# Patient Record
Sex: FEMALE | Race: WHITE | Employment: FULL TIME | URBAN - METROPOLITAN AREA
[De-identification: names, ages, dates, MRNs, and addresses within clinical notes are randomized per-mention and may not be internally consistent; named-entity substitution may affect disease eponyms.]

---

## 2020-02-18 RX ORDER — DEXTROMETHORPHAN/PSEUDOEPHED 2.5-7.5/.8
1.2 DROPS ORAL
Status: CANCELLED | OUTPATIENT
Start: 2020-02-18

## 2020-02-18 RX ORDER — SODIUM CHLORIDE 0.9 % (FLUSH) 0.9 %
5-40 SYRINGE (ML) INJECTION AS NEEDED
Status: CANCELLED | OUTPATIENT
Start: 2020-02-18

## 2020-02-18 RX ORDER — DIPHENHYDRAMINE HYDROCHLORIDE 50 MG/ML
50 INJECTION, SOLUTION INTRAMUSCULAR; INTRAVENOUS ONCE
Status: CANCELLED | OUTPATIENT
Start: 2020-02-18 | End: 2020-02-18

## 2020-02-18 RX ORDER — EPINEPHRINE 0.1 MG/ML
1 INJECTION INTRACARDIAC; INTRAVENOUS
Status: CANCELLED | OUTPATIENT
Start: 2020-02-18 | End: 2020-02-19

## 2020-02-18 RX ORDER — SODIUM CHLORIDE 0.9 % (FLUSH) 0.9 %
5-40 SYRINGE (ML) INJECTION EVERY 8 HOURS
Status: CANCELLED | OUTPATIENT
Start: 2020-02-18

## 2020-02-18 RX ORDER — ATROPINE SULFATE 0.1 MG/ML
0.5 INJECTION INTRAVENOUS
Status: CANCELLED | OUTPATIENT
Start: 2020-02-18 | End: 2020-02-19

## 2020-02-19 ENCOUNTER — HOSPITAL ENCOUNTER (OUTPATIENT)
Age: 50
Setting detail: OUTPATIENT SURGERY
Discharge: HOME | End: 2020-02-19
Attending: INTERNAL MEDICINE | Admitting: INTERNAL MEDICINE
Payer: COMMERCIAL

## 2020-02-19 VITALS
SYSTOLIC BLOOD PRESSURE: 128 MMHG | DIASTOLIC BLOOD PRESSURE: 67 MMHG | TEMPERATURE: 97.7 F | RESPIRATION RATE: 16 BRPM | HEIGHT: 64 IN | HEART RATE: 91 BPM | WEIGHT: 228 LBS | BODY MASS INDEX: 38.93 KG/M2 | OXYGEN SATURATION: 96 %

## 2020-02-19 LAB — GLUCOSE BLD STRIP.AUTO-MCNC: 178 MG/DL (ref 70–110)

## 2020-02-19 PROCEDURE — 82962 GLUCOSE BLOOD TEST: CPT

## 2020-02-19 PROCEDURE — 77030040361 HC SLV COMPR DVT MDII -B: Performed by: INTERNAL MEDICINE

## 2020-02-19 PROCEDURE — 99153 MOD SED SAME PHYS/QHP EA: CPT | Performed by: INTERNAL MEDICINE

## 2020-02-19 PROCEDURE — 76040000008: Performed by: INTERNAL MEDICINE

## 2020-02-19 PROCEDURE — G0500 MOD SEDAT ENDO SERVICE >5YRS: HCPCS | Performed by: INTERNAL MEDICINE

## 2020-02-19 PROCEDURE — 74011250636 HC RX REV CODE- 250/636: Performed by: INTERNAL MEDICINE

## 2020-02-19 RX ORDER — NALOXONE HYDROCHLORIDE 0.4 MG/ML
0.4 INJECTION, SOLUTION INTRAMUSCULAR; INTRAVENOUS; SUBCUTANEOUS
Status: DISCONTINUED | OUTPATIENT
Start: 2020-02-19 | End: 2020-02-19 | Stop reason: HOSPADM

## 2020-02-19 RX ORDER — MIDAZOLAM HYDROCHLORIDE 1 MG/ML
.25-5 INJECTION, SOLUTION INTRAMUSCULAR; INTRAVENOUS
Status: DISCONTINUED | OUTPATIENT
Start: 2020-02-19 | End: 2020-02-19 | Stop reason: HOSPADM

## 2020-02-19 RX ORDER — FLUMAZENIL 0.1 MG/ML
0.2 INJECTION INTRAVENOUS
Status: DISCONTINUED | OUTPATIENT
Start: 2020-02-19 | End: 2020-02-19 | Stop reason: HOSPADM

## 2020-02-19 RX ORDER — FENTANYL CITRATE 50 UG/ML
100 INJECTION, SOLUTION INTRAMUSCULAR; INTRAVENOUS
Status: DISCONTINUED | OUTPATIENT
Start: 2020-02-19 | End: 2020-02-19 | Stop reason: HOSPADM

## 2020-02-19 RX ORDER — LOSARTAN POTASSIUM 25 MG/1
25 TABLET ORAL DAILY
COMMUNITY

## 2020-02-19 RX ORDER — SODIUM CHLORIDE 9 MG/ML
1000 INJECTION, SOLUTION INTRAVENOUS CONTINUOUS
Status: DISCONTINUED | OUTPATIENT
Start: 2020-02-19 | End: 2020-02-19 | Stop reason: HOSPADM

## 2020-02-19 RX ADMIN — SODIUM CHLORIDE 1000 ML: 900 INJECTION, SOLUTION INTRAVENOUS at 08:19

## 2020-02-19 NOTE — PROCEDURES
[facility]  Colonoscopy Procedure Report  _______________________________________________________  Patient: Vielka Galloway                                         Attending Physician: Perfecto Santana MD    Patient ID: [id number]                                      Referring Physician: Liv Villa MD    Exam Date: February 19, 2020 _______________________________________________________      Introduction: A  48 y.o. female patient, presents for outpatient Colonoscopy    Indications: Screen colon cancer, average risk and asymptomatic. Consent: The benefits, risks, and alternatives to the procedure were discussed and informed consent was obtained from the patient. Preparation: EKG, pulse, pulse oximetry and blood pressure were monitored throughout the procedure. ASA Classification: Class 2 - . The heart is an S1-S2 and regular heart rate and rhythm. Lungs are clear to auscultation and percussion. Abdomen is soft, nondistended, and nontender. Mental Status: awake, alert, and oriented to person, place, and time    Medications:  · Previously sedated for the EGD  · Versed 4 mg IV throughout the procedure. Rectal Exam: Normal Rectal Exam slightly spastic anal sphincter. No Blood. Pathology Specimens: No specimens removed. Procedure: The colonoscope was passed with ease through the anus under direct visualization and advanced to the cecum. The scope was withdrawn and the mucosa was carefully examined. The quality of the preparation was excellent. The views were excellent. The patient's toleration of the procedure was excellent. The exam was done twice to the cecum. Total time is 15 minutes and withdrawal time is 10 minutes. Findings:    Rectum:   Medium sized internal hemorrhoids. Sigmoid:   Tortuous sigmoid colon. Descending Colon:   Normal  Transverse Colon:   Normal  Ascending Colon:   Normal  Cecum:   Normal  Terminal Ileum:   Not entered      Unplanned Events:  There were no unplanned events. Estimated Blood Loss: None  Impressions:    Medium sized internal hemorrhoids. Tortuous sigmoid colon. Normal Mucosa. No diverticula or polyps found. Complications: None; patient tolerated the procedure well. Recommendations:  · Discharge home when standard parameters are met. · Resume a high fiber diet. · Colonoscopy recommendation in 10 years.   · Consider getting hemorrhoidal band ligation done at the office if hemorrhoids are still annoying you despite high fiber diet  Procedure Codes:    · COLONOSCOPY [[id number] (Type: Custom)]    Endoscope Information:  Model Number(s)    [id number]   Assistant: None  Signed By: Estrellita Hernandez MD Date: February 19, 2020

## 2020-02-19 NOTE — DISCHARGE INSTRUCTIONS
[facility]  [id number]  1970    COLON / EGD DISCHARGE INSTRUCTIONS    Discomfort:  Sore throat- throat lozenges or warm salt water gargle  Redness at IV site- apply warm compress to area; if redness or soreness persist- contact your physician  There may be a slight amount of blood passed from the rectum  Gaseous discomfort- walking, belching will help relieve any discomfort  You may not operate a vehicle until next day  You may not engage in an occupation involving machinery or appliances until next day  You may not drink alcoholic beverages until next day  Avoid making any critical decisions for at least 24 hour    DIET:   High fiber and anti reflux diet. ACTIVITY:  You may not  resume your normal daily activities until the next day, it is recommended that you spend the remainder of the day resting -  avoid any strenuous activity. CALL M.D.  IF ANY SIGN OF:   Increasing pain, nausea, vomiting  Abdominal distension (swelling)  New increased bleeding (oral or rectal)  Fever (chills)  Pain in chest area  Bloody discharge from nose or mouth  Shortness of breath     You may take any Advil, Aspirin, Ibuprofen, Motrin, Aleve, or Goodys but preferably Tylenol as needed for pain. Follow-up Instructions: Follow-up in the office as scheduled or make a follow-up appointment in 2 weeks. Jacob Ying MD  February 19, 2020      DISCHARGE SUMMARY from Nurse    PATIENT INSTRUCTIONS:    After general anesthesia or intravenous sedation, for 24 hours or while taking prescription Narcotics:  · Limit your activities  · Do not drive and operate hazardous machinery  · Do not make important personal or business decisions  · Do  not drink alcoholic beverages  · If you have not urinated within 8 hours after discharge, please contact your surgeon on call.     Report the following to your surgeon:  · Excessive pain, swelling, redness or odor of or around the surgical area  · Temperature over 100.5  · Nausea and vomiting lasting longer than 4 hours or if unable to take medications  · Any signs of decreased circulation or nerve impairment to extremity: change in color, persistent  numbness, tingling, coldness or increase pain  · Any questions  . *  Please give a list of your current medications to your Primary Care Provider. *  Please update this list whenever your medications are discontinued, doses are      changed, or new medications (including over-the-counter products) are added. *  Please carry medication information at all times in case of emergency situations. These are general instructions for a healthy lifestyle:    No smoking/ No tobacco products/ Avoid exposure to second hand smoke  Surgeon General's Warning:  Quitting smoking now greatly reduces serious risk to your health. Obesity, smoking, and sedentary lifestyle greatly increases your risk for illness    A healthy diet, regular physical exercise & weight monitoring are important for maintaining a healthy lifestyle    You may be retaining fluid if you have a history of heart failure or if you experience any of the following symptoms:  Weight gain of 3 pounds or more overnight or 5 pounds in a week, increased swelling in our hands or feet or shortness of breath while lying flat in bed. Please call your doctor as soon as you notice any of these symptoms; do not wait until your next office visit. The discharge information has been reviewed with the patient and spouse. The patient and spouse verbalized understanding. Discharge medications reviewed with the patient and spouse and appropriate educational materials and side effects teaching were provided.  Patient armband removed and shredded  ___________________________________________________________________________________________________________________________________

## 2020-02-19 NOTE — H&P
Assessment/Plan  # Detail Type Description    1. Assessment Right upper quadrant abdominal pain (R10.11). Patient Plan 52year old female referred by Dr. Vikas Zepeda here right upper quadrant pain. Onset approx 1 month that is constant daily. Pain is rated 6/10 at its worst. Pain is described as  a stabbing pain. No aggravating or relieving factors. Eating does not affect the pain and pain does not affect sleep. Imaging includes an AB U/S 2019 and CT scan in 2019 that showed hepatosteatosis otherwise unremarkable. Abdominal Surgeries include cholecystectomy approx 20 years ago, Csection X1 and hysterectomy. No abdominal trauma. BM: daily, no blood/mucus  No chest pain or heartburn issues. Denies tobacco use but reports rare ETOH.  N/V/F/C  Appetite is good. Pt reports chronic use of NSAIDS Motrin several times a day with no relief. No Fam Hx Crohn's, IBS, U/C, Celiac, Diverticulosis, Colon Ca. Medical hx remarkable for DM Type I, and hyperlipidemia. PLAN: EGD scheduled, Hep Panel, Amylase, Lipase, CBC,    Plan Orders Amylase, Serum to be performed, CBC With Differential/Platelet to be performed, Hepatic Function Panel (7) to be performed and Lipase, Serum to be performed. 2. Assessment Encounter for screening colonoscopy (Z12.11). Patient Plan  52year old  female patient of Dr. Vikas Zepeda  for colonoscopy. Last colonoscopy completed 10 years ago. BM once daily. Normal color, soft, formed in consistency. No evidence of blood or mucus, changes in bowel pattern or constipation issues. Patient reports no allergies or herbal consumption. Medical hx includes ectopic kidney, heart murmur, DM Type 1. No significant pulmonary, GI, , renal, hepatic, musculoskeletal,  issues. Surgical hx remarkable for cholecystectomy, . No family history of colorectal CA. Denies tobacco and reports rare ETOH use. No significant weight gains or losses in the last 3-6 months.  No heat or cold intolerances. Patient states  no N/V/D, fever, chills, sick contacts, SOB, abdominal or chest pains. No dysphagia, appetite is good which consists of 3 meals per day. PLAN: Colonoscopy Scheduled     She has average risk for colon cancer and is asymptomatic. She would be having her screening colonoscopy. I explained to her the procedure of colonoscopy and the risks involved which include but not limited to reaction to sedation, bleeding, perforation, infection or missing a lesion if bowels are not well prepped or are unusually tortuous. She agreed to proceed with the procedure and answered her questions. I gave her the approved prep  to clean her bowels. I advised her to take if needed extra laxatives for few days before in case she is on the constipated side to assure adequate bowel prep. Told her not take her medications in the morning of the procedure because they would be flushed out with the prep but can take them more confidently after the procedure. I advised her to bring all her medication with her. This 52year old female presents for Abdominal Pain. History of Present Illness:  1. Abdominal Pain   Onset: 1 month ago. Severity is moderate. It occurs daily. The problem is unchanged. Location is RUQ. There is radiation to back. The patient describes it as aching and sharp. Denies aggravating factors. Relieving factors include Motrin. Associated symptoms include back pain, bloating, constipation, diarrhea, fatigue and flank pain. Pertinent negatives include blood in stool, change in appetite, diaphoresis, dizziness, dyspnea, eructation, fever, flatulence, flushing, heartburn, hematuria, jaundice, lightheadedness, menstruation, myalgia, nausea, rash, reflux, vaginal bleeding, vaginal discharge, vomiting, weight gain and weight loss. Additional information: Has not recently traveled out of country. Does not have reptilian pets. Does not have well water. PAST MEDICAL/SURGICAL HISTORY   (Detailed)    Disease/disorder Onset Date Management Date Comments     Hysterectomy, total       Cholecystectomy (20 years ago)     Diabetes          section           Family History  (Detailed)  Relationship Family Member Name  Age at Death Condition Onset Age Cause of Death       Family history of Hypertension  N       Family history of Kidney disease  N       Family history of Tuberculosis  N       Family history of Diabetes mellitus  N       Family history of Cancer, unknown  N       Family history of Heart disease  N       Family history of Mental illness  N       Family history of Asthma  N   Father    Myocardial infarction  N   Mother  N  Alive and well     Paternal grandmother    Tuberculosis  N   Sister  N  Alive and well           Social History:  (Detailed)  Tobacco use reviewed. The patient is right-handed. Preferred language is Declined to specify. Tobacco use status: Current non-smoker. Smoking status: Never smoker. SMOKING STATUS  Use Status Type Smoking Status Usage Per Day Years Used Total Pack Years   no/never  Never smoker        ALCOHOL  There is no history of alcohol use. CAFFEINE  The patient uses caffeine: coffee and soda. - 2 cups a day.             Medications (active prior to today)  Medication Instructions Start Date Stop Date Refilled Elsewhere   Humulin 70/30 U-100 Insulin 100 unit/mL subcutaneous suspension inject by subcutaneous route as per insulin protocol //   Y   cyanocobalamin (vitamin B-12) 1,000 mcg capsule  //   Y   Effexor XR 75 mg capsule,extended release take 1 capsule by oral route  every day with food //   Y   fenofibrate 160 mg tablet take 1 tablet by oral route  every day //   Y   ergocalciferol (vitamin D2) 50,000 unit capsule take 1 capsule by oral route  every week //   Y   metformin 1,000 mg tablet take 1 tablet by oral route 2 times every day with morning and evening meals //   Y     Patient Status   Completed with information received for patient in a summary of care record. Medications (Added, Continued or Stopped today)  Start Date Medication Directions PRN Status PRN Reason Instruction Stop Date    cyanocobalamin (vitamin B-12) 1,000 mcg capsule  N       Effexor XR 75 mg capsule,extended release take 1 capsule by oral route  every day with food N       ergocalciferol (vitamin D2) 50,000 unit capsule take 1 capsule by oral route  every week N       fenofibrate 160 mg tablet take 1 tablet by oral route  every day N       Humulin 70/30 U-100 Insulin 100 unit/mL subcutaneous suspension inject by subcutaneous route as per insulin protocol N       metformin 1,000 mg tablet take 1 tablet by oral route 2 times every day with morning and evening meals N        Allergies:  Ingredient Reaction (Severity) Medication Name Comment   SULFAMETHOXAZOLE Unknown SEPTRA    TRIMETHOPRIM Unknown SEPTRA    Reviewed, no changes. ORDERS:  Status Lab Order Time Frame Comments   ordered Lipase, Serum     ordered CBC With Differential/Platelet     ordered Amylase, Serum     ordered Hepatic Function Panel (7)     Review of Systems  System Neg/Pos Details   Constitutional Positive Fatigue. Constitutional Negative Chills, Fever, Flushing, Malaise, Weight gain and Weight loss. ENMT Negative Ear infections, Nasal congestion, Sinus Infection and Sore throat. Eyes Negative Double vision and Eye pain. Respiratory Negative Asthma, Chronic cough, Dyspnea, Pleuritic pain and Wheezing. Cardio Negative Chest pain, Edema and Irregular heartbeat/palpitations. GI Positive Bloating, Constipation, Diarrhea. GI Negative Abdominal pain, Blood in stool, Change in appetite, Change in bowel habits, Decreased appetite, Dysphagia, Eructation, Flatulence, Heartburn, Hematemesis, Hematochezia, Jaundice, Melena, Nausea, Reflux and Vomiting.  Positive Back pain, Flank pain.     Negative Dysuria, Hematuria, Menstruation, Urinary frequency, Urinary incontinence, Urinary retention and Vaginal bleeding. Endocrine Negative Cold intolerance, Diaphoresis, Heat intolerance and Increased thirst.   Neuro Negative Dizziness, Headache, Lightheadedness, Numbness, Tremors and Vertigo. Psych Negative Anxiety, Depression and Increased stress. Integumentary Negative Hives, Pruritus and Rash. MS Negative Back pain, Joint pain and Myalgia. Hema/Lymph Negative Easy bleeding, Easy bruising and Lymphadenopathy. Allergic/Immuno Negative Chemicals in work place, Contact allergy, Food allergies, Immunosuppression and Seasonal allergies. Reproductive Negative Breast lumps, Breast pain and Vaginal discharge. Vital Signs     Height  Time ft in cm Last Measured Height Position   3:43 PM 5.0 6.00 167.64 11/11/2019 Standing     MAP (Calculated) Arterial Line 1 BP (mmHg) BP Patient Position Resp SpO2 O2 Device O2 Flow Rate (L/min) Pre/Post Ductal Weight       02/19/20 0905 -- 99 147/83 104 -- -- 16 96 % Room air -- -- --   02/19/20 0900 -- 86 144/78 100 -- -- 17 97 % Room air -- -- --   02/19/20 0751 97.8 °F (36.6 °C) 83 127/83 98 -- -- 16 95 % Room air -- -- 103.4 kg (228 lb)           PHYSICAL EXAM:  Exam Findings Details   Constitutional * Nourishment - obese. Constitutional Normal No acute distress. Well developed. Eyes Normal Conjunctiva - Right: Normal, Left: Normal. Sclera - Right: Normal, Left: Normal.   Nasopharynx Normal Lips/teeth/gums - Normal. Buccal mucosa - Normal.   Neck Exam Normal Inspection - Normal. Palpation - Normal. Thyroid gland - Normal.   Respiratory Normal Inspection - Normal. Auscultation - Normal.   Cardiovascular Normal Regular rate and rhythm. No murmurs, gallops, or rubs. Vascular Normal Pulses - Radial: Normal, Brachial: Normal, Dorsalis pedis: Normal, Posterior tibial: Normal.   Abdomen * Inspection - rounded.    Abdomen Normal Appliance(s) - Normal. Abdominal muscles - Normal. Auscultation - Normal. Percussion - Normal. Anterior palpation - Normal, No guarding. Umbilicus - Normal. No abdominal tenderness. No hepatic enlargement. No spleen enlargement. No hernia. No Ascites. Boyce's sign - Negative. No hepatic tenderness. No hepatic bruit. Skin Normal Inspection - Normal.   Musculoskeletal Normal Hands/Wrist - Right: Normal, Left: Normal.   Extremity Normal No edema. Neurological Normal Fine motor skills - Normal.   Psychiatric Normal Orientation - Oriented to time, place, person & situation. Appropriate mood and affect.        No change in H&P

## 2020-02-19 NOTE — PROCEDURES
[facility]  Esophagogastroduodenoscopy Procedure Report  _______________________________________________________  Patient: Derek Gtz  Attending Physician: Audrey Jernigan MD    Patient ID: [id number]                                     Referring Physician: Dr. Mehran Handy    Exam Date: 2/19/2020   _______________________________________________________      Introduction: A 48 y.o. Female, presents for an Esophagogastroduodenoscopy Procedure    Indication: referred by Dr. Mehran Handy here right upper quadrant pain. Onset approx 1 month that is constant daily. Pain is rated 6/10 at its worst. Pain is described as  a stabbing pain. No aggravating or relieving factors. Eating does not affect the pain and pain does not affect sleep. Imaging includes an AB U/S 8/2019 and CT scan in 06/2019 that showed hepatosteatosis otherwise unremarkable. Abdominal Surgeries: cholecystectomy > 20 years ago, Csection X1 and hysterectomy. No abdominal trauma. BM: daily, no blood/mucus. No chest pain or heartburn issues. Denies tobacco use but reports rare. Pt reports chronic use of NSAIDS Motrin several times a day with no relief. No Fam Hx Crohn's, IBS, U/C, Celiac, Diverticulosis, Colon Ca. Medical hx remarkable for DM Type I, and hyperlipidemia. : Audrey Jernigan MD    Sedation:    Versed 5 mg IV, fentanyl 100 mcg IV    Procedure Details:  After infomed consent was obtained for the procedure, with all risks and benefits of procedure explained the patient was taken to the endoscopy suite and placed in the left lateral decubitus position. Following sequential administration of sedation as per above, the endoscope was inserted into the mouth and advanced under direct vision to third portion of the duodenum. A careful inspection was made as the gastroscope was withdrawn, including a retroflexed view of the proximal stomach; findings and interventions are described below.   ASA status: II      FINDINGS:  HYPOPHARYNX AND LARYNX: Normal  Esophagus: Normal proximal, middle and distal esophagus. Slightly irregular Z line. No Hiatal hernia. Diaphragmatic pinch located at 40 cm. No varices. Stomach: No food or liquid retention. Normal, cardia, fundus, body, lesser curvature, incisura, antrum and pylorus. Mucosa is normal.    Duodenum:   The bulb, second, third, fourth portions and major papilla are normal     Therapies:    none    Specimen:   none           Complications:   None    EBL:  None  IMPRESSION:  The Z line is Slightly irregular. The pain in the RUQ is simply caused by musculoskeletal or parietal cause       RECOMMENDATIONS: -GERD diet: avoid fried and fatty foods. peppermint, chocolate, alcohol, coffee, citrus fruits and juices, tomoato products; avoid lying down for 2 to 3 hours after eating.     Assistant: none    Debbie Hager MD  2/19/2020  9:10 AM

## 2021-10-05 ENCOUNTER — OFFICE VISIT (OUTPATIENT)
Dept: SURGERY | Age: 51
End: 2021-10-05
Payer: COMMERCIAL

## 2021-10-05 VITALS
BODY MASS INDEX: 37.85 KG/M2 | OXYGEN SATURATION: 100 % | HEART RATE: 97 BPM | HEIGHT: 65 IN | TEMPERATURE: 97.5 F | DIASTOLIC BLOOD PRESSURE: 70 MMHG | WEIGHT: 227.2 LBS | SYSTOLIC BLOOD PRESSURE: 134 MMHG

## 2021-10-05 DIAGNOSIS — I10 HYPERTENSION, UNSPECIFIED TYPE: ICD-10-CM

## 2021-10-05 DIAGNOSIS — K21.9 GASTROESOPHAGEAL REFLUX DISEASE, UNSPECIFIED WHETHER ESOPHAGITIS PRESENT: ICD-10-CM

## 2021-10-05 DIAGNOSIS — E10.9 TYPE 1 DIABETES MELLITUS WITHOUT COMPLICATION (HCC): ICD-10-CM

## 2021-10-05 DIAGNOSIS — G47.30 SLEEP APNEA, UNSPECIFIED TYPE: ICD-10-CM

## 2021-10-05 DIAGNOSIS — J45.909 ASTHMA, UNSPECIFIED ASTHMA SEVERITY, UNSPECIFIED WHETHER COMPLICATED, UNSPECIFIED WHETHER PERSISTENT: ICD-10-CM

## 2021-10-05 DIAGNOSIS — E66.01 SEVERE OBESITY (BMI 35.0-35.9 WITH COMORBIDITY) (HCC): Primary | ICD-10-CM

## 2021-10-05 PROCEDURE — 99205 OFFICE O/P NEW HI 60 MIN: CPT | Performed by: NURSE PRACTITIONER

## 2021-10-05 RX ORDER — PHENTERMINE HYDROCHLORIDE 37.5 MG/1
37.5 CAPSULE ORAL
COMMUNITY
End: 2022-01-12 | Stop reason: ALTCHOICE

## 2021-10-05 NOTE — PROGRESS NOTES
Bariatric Surgery Consultation    Subjective: The patient is a 46 y.o. obese female with a Body mass index is 38.4 kg/m². .  The patient is at her heaviest weight for the past several years. she has been overweight since starting insulin and having hysterectomy. she has been considering surgery since past year. she desires surgery at this time because of multiple health concerns and their lifestyle issues which are hindered by their weight. she has been referred by her family physician Dr Kami Ackerman for evaluation and treatment of their obesity via surgical intervention. Tram Carbajal has tried multiple diets in her lifetime most recently tried physician supervised, behavior modification, unsupervised diets, Weight Watchers, Overeaters Anonymous, Phentermine and Byetta and [employer]    Bariatric comorbidities present are   Patient Active Problem List   Diagnosis Code    Incontinence in female R32    DM (diabetes mellitus) ([facility] [address] E11.9    Renal disorder N28.9    Urinary incontinence R32    Stress incontinence N39.3    Joint pain M25.50    HTN (hypertension) I10    Hyperlipidemia E78.5    Depression F32. A    Chronic kidney disease N18.9    Asthma J45.909    Severe obesity (BMI 35.0-35.9 with comorbidity) (HCC) E66.01, Z68.35    Cervical vertebral fusion syndrome Q76.1    GERD (gastroesophageal reflux disease) K21.9    Arthritis M19.90    Fatty liver K76.0    Type I diabetes mellitus (HCC) E10.9    Carpal tunnel syndrome G56.00    Sleep apnea G47.30       The patient is considering laparoscopic adjustable gastric band surgery, laparoscopic gastric bypass surgery and laparoscopic sleeve gastrectomy for surgical weight loss due to their ineffective progress with medical forms of weight loss and the urging of their physician who cares for their primary medical issues.  The patient  now presents  for consideration for weight loss surgery understanding the benefits of this over a medical approach of weight loss as was discussed in our presentation on weight loss surgery. They have discussed their plans both with their family and primary care physician who is in support of their pursuit of such. The patient has not had health issues as of late and denies and gastrointestinal disturbances other than what is outlined below in their review of symptoms. All of their prior evaluations available by both their PCP's and specialists physicians have been reviewed today either in the Care Everywhere portal or scanned under the media tab. I have spent a large portion of my initial consultation today reviewing the patients current dietary habits which have contributed to their health issues and obesity. I have suggested to them personally a dietary regimen that they can initiate now to help with their status as it pertains to their weight. They understand that the most important aspect of their journey through their weight loss endeavor will be their adherence to a new lifestyle of healthy eating behavior. They also understand that an adherence to an exercise program will not only help with weight loss but is ultimately important in weight maintenance. The patients goal weight is below 200 lb. We discussed BMI goal of 28 which is 165 lbs. These goals are consistent with expected outcomes of their desired operation. her Medical goals are resolution of these health issues.         Patient Active Problem List    Diagnosis Date Noted    Chronic kidney disease     Asthma     Severe obesity (BMI 35.0-35.9 with comorbidity) ([address]     Cervical vertebral fusion syndrome     GERD (gastroesophageal reflux disease)     Arthritis     Fatty liver     Type I diabetes mellitus ([address]     Carpal tunnel syndrome     Sleep apnea     Joint pain     HTN (hypertension)     Hyperlipidemia     Depression     DM (diabetes mellitus) ([address]     Renal disorder     Urinary incontinence     Stress incontinence     Incontinence in female 2016     Past Surgical History:   Procedure Laterality Date    COLONOSCOPY N/A 2020    COLONOSCOPY performed by Gabby Snider MD at [facility] ENDOSCOPY     [address] HX HYSTERECTOMY      HX OPEN CHOLECYSTECTOMY      HX UROLOGICAL  2016    Sling operation for female stress incontinence, [place]      Social History     Tobacco Use    Smoking status: Former Smoker     Quit date: 2000     Years since quittin.6    Smokeless tobacco: Never Used   Substance Use Topics    Alcohol use: No      Family History   Problem Relation Age of Onset    Heart defect Mother         valvular    Arthritis Mother     Heart Disease Father     Heart Attack Father     Diabetes Father       Current Outpatient Medications   Medication Sig Dispense Refill    phentermine 37.5 mg capsule Take 37.5 mg by mouth every morning.  losartan (COZAAR) 25 mg tablet Take 25 mg by mouth daily.  rosuvastatin (CRESTOR) 20 mg tablet take 1 tablet by mouth at bedtime  0     insulin pump (PATIENT SUPPLIED) misc by SubCUTAneous route as needed.  metFORMIN (GLUCOPHAGE) 1,000 mg tablet   0    HUMULIN R 500 unit/mL soln   0    AEROCHAMBER MAX WITH FLOW-VU   0    fenofibrate nanocrystallized (TRICOR) 145 mg tablet Take  by mouth daily.  albuterol (PROVENTIL HFA, VENTOLIN HFA, PROAIR HFA) 90 mcg/actuation inhaler Take  by inhalation.  venlafaxine-SR (EFFEXOR-XR) 75 mg capsule Take  by mouth daily.        Allergies   Allergen Reactions    Kiwi Anaphylaxis    Sulfa (Sulfonamide Antibiotics) Swelling          Review of Systems:       General - No history or complaints of unexpected fever, chills, or weight loss  Head/Neck - No history or complaints of headache, diplopia, dysphagia, hearing loss  Cardiac - No history or complaints of chest pain, palpitations, murmur, or shortness of breath  Pulmonary - No history or complaints of shortness of breath, productive cough, hemoptysis  Gastrointestinal - has reflux,no  abdominal pain, obstipation/constipation or blood per rectum  Genitourinary - No history or complaints of hematuria/dysuria, stress urinary incontinence symptoms, or renal lithiasis  Musculoskeletal - has joint pain in their back, hips,  no muscular weakness  Hematologic - No history or complaints of bleeding disorders,  No blood transfusions  Neurologic - No history or complaints of  migraine headaches, seizure activity, syncopal episodes, TIA or stroke  Integumentary - No history or complaints of rashes, abnormal nevi, skin cancer  Gynecological - No abnormal bleeding s/p hysterectomy               Objective:     Visit Vitals  /70   Pulse 97   Temp 97.5 °F (36.4 °C)   Ht 5' 4.5\" (1.638 m)   Wt 103.1 kg (227 lb 3.2 oz)   SpO2 100%   BMI 38.40 kg/m²       Physical Examination: General appearance - alert, well appearing, and in no distress  Mental status - alert, oriented to person, place, and time  Neck - supple, no significant adenopathy  Lymphatics - no palpable lymphadenopathy, no hepatosplenomegaly  Chest - clear to auscultation, no wheezes, rales or rhonchi, symmetric air entry  Heart - normal rate, regular rhythm, normal S1, S2, no murmurs, rubs, clicks or gallops  Abdomen - soft, nontender, nondistended, no masses or organomegaly  Back exam - full range of motion, no tenderness, palpable spasm or pain on motion  Neurological - alert, oriented, normal speech, no focal findings or movement disorder noted  Musculoskeletal - no joint tenderness, deformity or swelling  Extremities - peripheral pulses normal, no pedal edema, no clubbing or cyanosis  Skin - normal coloration and turgor, no rashes, no suspicious skin lesions noted    Labs:       No results found for this or any previous visit (from the past 1440 hour(s)).     Assessment:     Morbid obesity with comorbidity    Plan:     laparoscopic gastric bypass surgery and laparoscopic sleeve gastrectomy    This is a 46 y.o. female with a BMI of Body mass index is 38.4 kg/m². and the weight-related co-morbidties as noted below. Lachelle Tena meets the NIH criteria for bariatric surgery based upon the BMI of Body mass index is 38.4 kg/m². and multiple weight-related co-morbidties. Lachelle Tena has elected laparoscopic sleeve gastrectomy as her intervention of choice for treatment of morbid obestiy through surgical means secondary to its safety profile, rapid return to work  and decreases in operative risks over gastric bypass. In the office today, following Gloria's history and physical examination, a 30 minute discussion regarding the anatomic alterations for the laparoscopic sleeve gastrectomy was undertaken. The dietary expectations and the patient and physician dependent factors for success were thoroughly discussed, to include the need for interval follow-up and long-term dietary changes associated with success. The possible complications of the sleeve gastrectomy  were also discussed, to include;death, DVT/PE, staple line leak, bleeding, stricture formation, infection, nutritional deficiencies and sleeve dilation. Specific weight related outcomes for success were also discussed with an emphasis on careful and close follow-up with the first year and eating behavior modification as the baseline and cyclical hunger return. The patient expressed an understanding of the above factors, and her questions were answered in their entirety. In addition, the patient watched a 1.5 hour power point seminar regarding obesity, surgical weight loss including, adjustable gastric band, gastric bypass, and sleeve gastrectomy. This discussion contrasted the different surgical techniques, mechanisms of actions and expected outcomes, and surgical and medical risks associated with each procedure.       Today, the patient had all of her questions answered and desires to proceed with  bariatric surgery initially choosing sleeve gastrectomy as her surgical option. Secondary Diagnoses:     Dietary Intervention  - The patient is currently scheduled to see or has been followed by a bariatric nutritionist for an attempt at preoperative weight loss as has been dictated by their insurance carrier. They will be assessed at various times during their follow up to evaluate their progress depending on the length of time that is required once again by their carrier. I have explained the importance of preoperative weight loss and the benefits regarding lower surgical risk and also assisting the patient in reaching their weight loss goal.  Finally they understand there is a physiologic benefit from the standpoint of hepatic volume reduction and reduction of central visceral adiposity preoperatively. I have reiterated the importance of a low carbohydrate and high protein regimen to achieve their stated goal. I have reviewed their current eating behavior prior to this encounter and explained to them in an exhaustive fashion the appropriate diet that they should adhere to. They have been encouraged to loose weight pre operatively and understand it is our prerogative to cancel surgery or postpone their procedure in the event of significant weight gain. Adult Onset Diabetes - The patient has janice given a very low carbohydrate diet preoperatively along with instructions to monitor their blood sugars on a regular daily basis.  When  their surgery is performed  we will be monitoring the patient with sliding scale insulin and accuchecks.  Based on those values we will determine whether the patient needs a reduction of those medications postoperatively or total removal of those medications on discharge.  We will have the patient continue accuchecks postoperatively while at home also and report to me or their family physician for appropriate adjustments as needed.  The patient also understands that in the event of uncontrolled blood sugar preoperatively that we may choose to postpone their surgery. Hypertension - The patient has a clear understanding of how weight loss improves hypertension as a whole, but also they understand that there is a significant genetic component to this disease process. We will monitor the patients blood pressure while in the hospital and the plan would be to continue those medications postoperatively.  If a diuretic is being used we will stop them on discharge to prevent dehydration particularly with the sleeve gastrectomy and the gastric bypass procedures.  They will be instructed to monitor their blood pressure postoperatively while at home and notify their primary care physician in the event of any significantly high or uncharacteristic readings. Obstructive Sleep Apnea -The patient understands the association of sleep apnea and obesity and the additional risk that it caries related to post surgical complications. If they have not been tested for sleep apnea and I feel they are at increased risk for this diagnosis, then they will be scheduled for a consultation with a Pulmonologist for such. In the event that they flaco this diagnosis we will have the patient bring their CPAP machine to the hospital for use both postoperatively in the PACU and on the floor at its appropriate setting.  We will have them continue using it while at home after surgery and follow up with their pulmonologist 6 months after to be retested to see if it can be discontinued at that time period. Hyperlipidemia - The patient understands that studies show that almost all patient will realize an improvement in their lipid profile with weight loss that occurs with these procedures. They however also understand that hyperlipidemia is a multifactorial disease particularly as it pertains to their genetic background and that there is no guarantee toward cure  of this issue.  We will resume their medications both pre operatively and immediately postoperatively as this tends to decrease any post operative cardiac events.  The patient will follow up with their family physician in the postoperative period with plans to repeat their lipid panel 2-3 month postoperative for potential adjustment or removal of these medications. GERD -The patient understands that weight loss surgery is not a guaranteed cure for reflux disease but does understand the benefits that weight loss can have on reflux disease. They also understand that at the time of surgery the gastroesophageal junction will be evaluated for the presence of a diaphragmatic hernia. Hernias will be corrected always with the gastric band and sleeve gastrectomy procedures, but only on a case by case basis with the gastric bypass. The patient also understands that neither weight loss surgery nor repair of a diaphragmatic hernia repair guarantees the complete cessation of the disease. Weight Related Arthritis -The patient understands the benefits that weight loss surgery can have on their arthritis but also understands that weight loss is not a guaranteed cure and relief of symptoms is often dependent on the severity of the underlying disease. The patient also understands that traditional pharmaceutical treatments for this diagnosis are usually unavailable to post-operative weight loss patients due to the effects on the gastrointestinal tract. Any changes to the patients medication treatment will ultimately be made the patients PCP with input by our office.       Signed By: Adilson Cowan NP     October 5, 2021

## 2021-11-10 ENCOUNTER — HOSPITAL ENCOUNTER (OUTPATIENT)
Age: 51
Setting detail: OUTPATIENT SURGERY
Discharge: HOME | End: 2021-11-10
Attending: SPECIALIST | Admitting: SPECIALIST
Payer: COMMERCIAL

## 2021-11-10 ENCOUNTER — APPOINTMENT (OUTPATIENT)
Dept: GENERAL RADIOLOGY | Age: 51
End: 2021-11-10
Attending: SPECIALIST
Payer: COMMERCIAL

## 2021-11-10 ENCOUNTER — APPOINTMENT (OUTPATIENT)
Dept: SURGERY | Age: 51
End: 2021-11-10

## 2021-11-10 VITALS
HEIGHT: 64 IN | BODY MASS INDEX: 38.46 KG/M2 | RESPIRATION RATE: 18 BRPM | TEMPERATURE: 98.2 F | HEART RATE: 88 BPM | SYSTOLIC BLOOD PRESSURE: 156 MMHG | OXYGEN SATURATION: 98 % | DIASTOLIC BLOOD PRESSURE: 86 MMHG | WEIGHT: 225.3 LBS

## 2021-11-10 DIAGNOSIS — E66.01 MORBID OBESITY (HCC): ICD-10-CM

## 2021-11-10 DIAGNOSIS — Z79.4 CONTROLLED TYPE 2 DIABETES MELLITUS WITHOUT COMPLICATION, WITH LONG-TERM CURRENT USE OF INSULIN (HCC): Primary | ICD-10-CM

## 2021-11-10 DIAGNOSIS — K21.9 GASTROESOPHAGEAL REFLUX DISEASE, UNSPECIFIED WHETHER ESOPHAGITIS PRESENT: ICD-10-CM

## 2021-11-10 DIAGNOSIS — E11.9 CONTROLLED TYPE 2 DIABETES MELLITUS WITHOUT COMPLICATION, WITH LONG-TERM CURRENT USE OF INSULIN (HCC): Primary | ICD-10-CM

## 2021-11-10 PROCEDURE — 76040000019: Performed by: SPECIALIST

## 2021-11-10 PROCEDURE — 74240 X-RAY XM UPR GI TRC 1CNTRST: CPT | Performed by: SPECIALIST

## 2021-11-10 PROCEDURE — 74240 X-RAY XM UPR GI TRC 1CNTRST: CPT

## 2021-11-10 PROCEDURE — 74011000250 HC RX REV CODE- 250: Performed by: SPECIALIST

## 2022-01-12 ENCOUNTER — OFFICE VISIT (OUTPATIENT)
Dept: SURGERY | Age: 52
End: 2022-01-12
Payer: COMMERCIAL

## 2022-01-12 ENCOUNTER — HOSPITAL ENCOUNTER (OUTPATIENT)
Dept: LAB | Age: 52
Discharge: HOME | End: 2022-01-12

## 2022-01-12 VITALS
HEIGHT: 64 IN | TEMPERATURE: 98.1 F | BODY MASS INDEX: 39.57 KG/M2 | HEART RATE: 86 BPM | WEIGHT: 231.8 LBS | DIASTOLIC BLOOD PRESSURE: 69 MMHG | OXYGEN SATURATION: 97 % | SYSTOLIC BLOOD PRESSURE: 153 MMHG

## 2022-01-12 DIAGNOSIS — I10 HYPERTENSION, UNSPECIFIED TYPE: ICD-10-CM

## 2022-01-12 DIAGNOSIS — K31.84 GASTROPARESIS: ICD-10-CM

## 2022-01-12 DIAGNOSIS — K21.9 GASTROESOPHAGEAL REFLUX DISEASE, UNSPECIFIED WHETHER ESOPHAGITIS PRESENT: ICD-10-CM

## 2022-01-12 DIAGNOSIS — J45.909 ASTHMA, UNSPECIFIED ASTHMA SEVERITY, UNSPECIFIED WHETHER COMPLICATED, UNSPECIFIED WHETHER PERSISTENT: ICD-10-CM

## 2022-01-12 DIAGNOSIS — E66.01 SEVERE OBESITY (BMI 35.0-35.9 WITH COMORBIDITY) (HCC): Primary | ICD-10-CM

## 2022-01-12 DIAGNOSIS — E11.9 CONTROLLED TYPE 2 DIABETES MELLITUS WITHOUT COMPLICATION, WITH LONG-TERM CURRENT USE OF INSULIN (HCC): Primary | ICD-10-CM

## 2022-01-12 DIAGNOSIS — M19.90 ARTHRITIS: ICD-10-CM

## 2022-01-12 DIAGNOSIS — E78.5 HYPERLIPIDEMIA, UNSPECIFIED HYPERLIPIDEMIA TYPE: ICD-10-CM

## 2022-01-12 DIAGNOSIS — E10.9 TYPE 1 DIABETES MELLITUS WITHOUT COMPLICATION (HCC): ICD-10-CM

## 2022-01-12 DIAGNOSIS — F32.A DEPRESSION, UNSPECIFIED DEPRESSION TYPE: ICD-10-CM

## 2022-01-12 DIAGNOSIS — G47.30 SLEEP APNEA, UNSPECIFIED TYPE: ICD-10-CM

## 2022-01-12 DIAGNOSIS — K76.0 FATTY LIVER: ICD-10-CM

## 2022-01-12 DIAGNOSIS — Z79.4 CONTROLLED TYPE 2 DIABETES MELLITUS WITHOUT COMPLICATION, WITH LONG-TERM CURRENT USE OF INSULIN (HCC): Primary | ICD-10-CM

## 2022-01-12 DIAGNOSIS — M25.569 ARTHRALGIA OF KNEE, UNSPECIFIED LATERALITY: ICD-10-CM

## 2022-01-12 DIAGNOSIS — N39.498 OTHER URINARY INCONTINENCE: ICD-10-CM

## 2022-01-12 DIAGNOSIS — E66.01 SEVERE OBESITY (BMI 35.0-35.9 WITH COMORBIDITY) (HCC): ICD-10-CM

## 2022-01-12 LAB — SENTARA SPECIMEN COL,SENBCF: NORMAL

## 2022-01-12 PROCEDURE — 99001 SPECIMEN HANDLING PT-LAB: CPT

## 2022-01-12 PROCEDURE — 99215 OFFICE O/P EST HI 40 MIN: CPT | Performed by: SPECIALIST

## 2022-01-12 NOTE — PROGRESS NOTES
Bariatric Surgery Consultation    Subjective: The patient is a 46 y.o. obese female with a Body mass index is 39.79 kg/m². .  The patient is at her heaviest weight for the past 10 years. she has been overweight since age 23.   she has been considering surgery since last year. she desires surgery at this time because of multiple health concerns and their lifestyle issues which are hindered by their weight. she has been referred by his family physician Dr Shannan Ruffin for evaluation and treatment of their obesity via surgical intervention. Omar Ibarra has tried multiple diets in her lifetime most recently tried physician supervised, behavior modification and unsupervised diets    Bariatric comorbidities present are   Patient Active Problem List   Diagnosis Code    Incontinence in female R32    DM (diabetes mellitus) ([facility] [address] E11.9    Renal disorder N28.9    Urinary incontinence R32    Stress incontinence N39.3    Joint pain M25.50    HTN (hypertension) I10    Hyperlipidemia E78.5    Depression F32. A    Chronic kidney disease N18.9    Asthma J45.909    Severe obesity (BMI 35.0-35.9 with comorbidity) (HCC) E66.01, Z68.35    Cervical vertebral fusion syndrome Q76.1    GERD (gastroesophageal reflux disease) K21.9    Arthritis M19.90    Fatty liver K76.0    Type I diabetes mellitus (HCC) E10.9    Carpal tunnel syndrome G56.00    Sleep apnea G47.30       The patient is considering laparoscopic sleeve gastrectomy for surgical weight loss due to their ineffective progress with medical forms of weight loss and the urging of their physician who cares for their primary medical issues. The patient  now presents  for consideration for weight loss surgery understanding the benefits of this over a medical approach of weight loss as was discussed in our presentation on weight loss surgery.  They have discussed their plans both with their family and primary care physician who is in support of their pursuit of such. The patient has not had health issues as of late and denies and gastrointestinal disturbances other than what is outlined below in their review of symptoms. All of their prior evaluations available by both their PCP's and specialists physicians have been reviewed today either in the Care Everywhere portal or scanned under the media tab. I have spent a large portion of my initial consultation today reviewing the patients current dietary habits which have contributed to their health issues and obesity. I have suggested to them personally a dietary regimen that they can initiate now to help with their status as it pertains to their weight. They understand that the most important aspect of their journey through their weight loss endeavor will be their adherence to a new lifestyle of healthy eating behavior. They also understand that an adherence to an exercise program will not only help with weight loss but is ultimately important in weight maintenance. The patients goal weight is 150lb. These goals are consistent with expected outcomes of their desired operation. her Medical goals are resolution of these health issues.         Patient Active Problem List    Diagnosis Date Noted    Chronic kidney disease     Asthma     Severe obesity (BMI 35.0-35.9 with comorbidity) ([address]     Cervical vertebral fusion syndrome     GERD (gastroesophageal reflux disease)     Arthritis     Fatty liver     Type I diabetes mellitus ([address]     Carpal tunnel syndrome     Sleep apnea     Joint pain     HTN (hypertension)     Hyperlipidemia     Depression     DM (diabetes mellitus) ([address]     Renal disorder     Urinary incontinence     Stress incontinence     Incontinence in female 08/01/2016     Past Surgical History:   Procedure Laterality Date    COLONOSCOPY N/A 2/19/2020    COLONOSCOPY performed by Christine Becerril MD at [address] [place] [address] HX HYSTERECTOMY      HX OPEN CHOLECYSTECTOMY      HX UROLOGICAL  2016    Sling operation for female stress incontinence, [place]      Social History     Tobacco Use    Smoking status: Former Smoker     Years: 2.00     Quit date: 1996     Years since quittin.9    Smokeless tobacco: Never Used   Substance Use Topics    Alcohol use: No      Family History   Problem Relation Age of Onset    Heart defect Mother         valvular    Arthritis Mother     Heart Disease Father     Heart Attack Father     Diabetes Father       Current Outpatient Medications   Medication Sig Dispense Refill    losartan (COZAAR) 25 mg tablet Take 25 mg by mouth daily.  rosuvastatin (CRESTOR) 20 mg tablet take 1 tablet by mouth at bedtime  0     insulin pump (PATIENT SUPPLIED) misc 200 Units by SubCUTAneous route daily.  metFORMIN (GLUCOPHAGE) 1,000 mg tablet   0    HUMULIN R 500 unit/mL soln   0    AEROCHAMBER MAX WITH FLOW-VU   0    fenofibrate nanocrystallized (TRICOR) 145 mg tablet Take  by mouth daily.  venlafaxine-SR (EFFEXOR-XR) 75 mg capsule Take  by mouth daily.  albuterol (PROVENTIL HFA, VENTOLIN HFA, PROAIR HFA) 90 mcg/actuation inhaler Take  by inhalation.  (Patient not taking: Reported on 2022)       Allergies   Allergen Reactions    Kiwi Anaphylaxis    Sulfa (Sulfonamide Antibiotics) Swelling          Review of Systems:       General - No history or complaints of unexpected fever, chills, or weight loss  Head/Neck - No history or complaints of headache, diplopia, dysphagia, hearing loss  Cardiac - No history or complaints of chest pain, palpitations, murmur, or shortness of breath  Pulmonary - No history or complaints of shortness of breath, productive cough, hemoptysis  Gastrointestinal - mild reflux,no  abdominal pain, obstipation/constipation or blood per rectum  Genitourinary - No history or complaints of hematuria/dysuria, stress urinary incontinence symptoms, or renal lithiasis  Musculoskeletal - moderate joint pain ,  no muscular weakness  Hematologic - No history or complaints of bleeding disorders,  No blood transfusions  Neurologic - No history or complaints of  migraine headaches, seizure activity, syncopal episodes, TIA or stroke  Integumentary - No history or complaints of rashes, abnormal nevi, skin cancer  Gynecological - n/a               Objective:     Visit Vitals  BP (!) 153/69   Pulse 86   Temp 98.1 °F (36.7 °C)   Ht 5' 4\" (1.626 m)   Wt 105.1 kg (231 lb 12.8 oz)   SpO2 97%   BMI 39.79 kg/m²       Physical Examination: General appearance - alert, well appearing, and in no distress and oriented to person, place, and time  Mental status - alert, oriented to person, place, and time, normal mood, behavior, speech, dress, motor activity, and thought processes  Eyes - pupils equal and reactive, extraocular eye movements intact, sclera anicteric, left eye normal, right eye normal  Ears - right ear normal, left ear normal  Nose - normal and patent, no erythema, discharge or polyps  Mouth - mucous membranes moist, pharynx normal without lesions  Neck - supple, no significant adenopathy  Lymphatics - no palpable lymphadenopathy, no hepatosplenomegaly  Chest - clear to auscultation, no wheezes, rales or rhonchi, symmetric air entry  Heart - normal rate, regular rhythm, normal S1, S2, no murmurs, rubs, clicks or gallops  Abdomen - soft, nontender, nondistended, no masses or organomegaly  Back exam - full range of motion, no tenderness, palpable spasm or pain on motion  Neurological - alert, oriented, normal speech, no focal findings or movement disorder noted  Musculoskeletal - no joint tenderness, deformity or swelling  Extremities - peripheral pulses normal, no pedal edema, no clubbing or cyanosis  Skin - normal coloration and turgor, no rashes, no suspicious skin lesions noted    Labs:       No results found for this or any previous visit (from the past 1440 hour(s)).     Assessment:     Morbid obesity with comorbidity    Plan:     laparoscopic sleeve gastrectomy    This is a 46 y.o. female with a BMI of Body mass index is 39.79 kg/m². and the weight-related co-morbidties as noted below. Kristin Estrada meets the NIH criteria for bariatric surgery based upon the BMI of Body mass index is 39.79 kg/m². and multiple weight-related co-morbidties. Kristin Estrada has elected laparoscopic sleeve gastrectomy as her intervention of choice for treatment of morbid obestiy through surgical means secondary to its safety profile, rapid return to work  and decreases in operative risks over gastric bypass. In the office today, following Gloria's history and physical examination, a 30 minute discussion regarding the anatomic alterations for the laparoscopic sleeve gastrectomy was undertaken. The dietary expectations and the patient and physician dependent factors for success were thoroughly discussed, to include the need for interval follow-up and long-term dietary changes associated with success. The possible complications of the sleeve gastrectomy  were also discussed, to include;death, DVT/PE, staple line leak, bleeding, stricture formation, infection, nutritional deficiencies and sleeve dilation. Specific weight related outcomes for success were also discussed with an emphasis on careful and close follow-up with the first year and eating behavior modification as the baseline and cyclical hunger return. The patient expressed an understanding of the above factors, and her questions were answered in their entirety. In addition, the patient watched a seminar regarding obesity, surgical weight loss including, adjustable gastric band, gastric bypass, and sleeve gastrectomy. This discussion contrasted the different surgical techniques, mechanisms of actions and expected outcomes, and surgical and medical risks associated with each procedure.   In office today we had a long question and answer session where each questions was answered until there were no additional questions. Today, the patient had all of her questions answered and desires to proceed with  bariatric surgery initially choosing sleeve gastrectomy as her surgical option. Secondary Diagnoses:     Dietary Intervention  - The patient is currently scheduled to see or has been followed by a bariatric nutritionist for an attempt at preoperative weight loss as has been dictated by their insurance carrier. They will be assessed at various times during their follow up to evaluate their progress depending on the length of time that is required once again by their carrier. I have explained the importance of   preoperative weight loss and the benefits regarding lower surgical risk and also assisting the patient in reaching their weight loss goal. They understand also that they should participate in an  exercise program to assist in this weight loss. Finally they understand there is a physiologic benefit from the standpoint of hepatic volume reduction and reduction of central visceral adiposity   preoperatively. I have reiterated the importance of a low carbohydrate and high protein regimen to achieve their   stated goal. I have reviewed their current eating behavior prior to this encounter and explained to them in an exhaustive fashion the appropriate diet that they should adhere to. They have been   encouraged to loose weight pre operatively and understand it is our prerogative to cancel surgery or postpone their procedure in the event of significant weight gain. Adult Onset Diabetes - The patient has janice given a very low carbohydrate diet preoperatively along with instructions to monitor their blood sugars on a regular daily basis.  When  their surgery is performed  we will be monitoring the patient with sliding scale insulin and accuchecks.  Based on those values we will determine whether the patient needs a reduction of those medications postoperatively or total removal of those medications on discharge.  We will have the patient continue accuchecks postoperatively while at home also and report to me or their family physician for appropriate adjustments as needed.  The patient also understands that in the event of uncontrolled blood sugar preoperatively that we may choose to postpone their surgery. Hypertension - The patient has a clear understanding of how weight loss improves hypertension as a whole, but also they understand that there is a significant genetic component to this disease process. We will monitor the patients blood pressure while in the hospital and the plan would be to continue those medications postoperatively.  If a diuretic is being used we will stop them on discharge to prevent dehydration particularly with the sleeve gastrectomy and the gastric bypass procedures.  They will be instructed to monitor their blood pressure postoperatively while at home and notify their primary care physician in the event of any significantly high or uncharacteristic readings. Hyperlipidemia - The patient understands that studies show that almost all patient will realize an improvement in their lipid profile with weight loss that occurs with these procedures. They however also understand that hyperlipidemia is a multifactorial disease particularly as it pertains to their genetic background and that there is no guarantee toward cure  of this issue. We will resume their medications immediately postoperatively as this tends to decrease any post operative cardiac events.  The patient will follow up with their family physician in the postoperative period with plans to repeat their lipid panel 2-3 month postoperative for potential adjustment or removal of these medications.     Weight Related Arthritis -The patient understands the benefits that weight loss surgery can have on their arthritis but also understands that weight loss is not a guaranteed cure and relief of symptoms is often dependent on the severity of the underlying disease.  The patient also understands that traditional pharmaceutical treatments for this diagnosis are usually unavailable to post-operative weight loss patients due to the effects on the gastrointestinal tract particularly with the gastric bypass and to a lesser effect with the sleeve gastrectomy.  Any changes to the patients medication treatment will ultimately be made the patients PCP with input by our office. Obstructive Sleep Apnea -The patient understands the association of sleep apnea and obesity and the additional risk that it caries related to post surgical complications. If they have not been tested for sleep apnea and I feel they are at increased risk for this diagnosis, then they will be scheduled for a consultation with a Pulmonologist for such. In the event that they flaco this diagnosis we will have the patient bring their CPAP machine to the hospital for use both postoperatively in the PACU and on the floor at its appropriate setting.  We will have them continue using it while at home after surgery and follow up with their pulmonologist 6 months after to be retested to see if it can be discontinued at that time period. Possible or Existing Coronary Artery Disease - The patient has undergone or will undergo a preoperative evaluation by their cardiologist, or a cardiologist of their choice such that they are deemed   a reasonable candidate for surgery. The patient understands that with a history of cardiac disease that there is always an increased risk compared   to the average patient. Appropriate recommendations have been followed as recommended by the cardiologist.  The patients ASA will be resumed   approximately 1 month postoperatively in a coated form if appropriate. Will send to Dr [place].       Signed By: Brenda Daniels MD     January 12, 2022

## 2022-01-13 LAB
A-G RATIO,AGRAT: 2.5 RATIO (ref 1.1–2.6)
ALBUMIN SERPL-MCNC: 4.5 G/DL (ref 3.5–5)
ALP SERPL-CCNC: 83 U/L (ref 25–115)
ALT SERPL-CCNC: 34 U/L (ref 5–40)
ANION GAP SERPL CALC-SCNC: 14 MMOL/L (ref 3–15)
AST SERPL W P-5'-P-CCNC: 37 U/L (ref 10–37)
AVG GLU, 10930: 161 MG/DL (ref 91–123)
BILIRUB SERPL-MCNC: 0.2 MG/DL (ref 0.2–1.2)
BUN SERPL-MCNC: 8 MG/DL (ref 6–22)
CALCIUM SERPL-MCNC: 9.5 MG/DL (ref 8.4–10.5)
CHLORIDE SERPL-SCNC: 105 MMOL/L (ref 98–110)
CO2 SERPL-SCNC: 24 MMOL/L (ref 20–32)
CREAT SERPL-MCNC: 0.5 MG/DL (ref 0.5–1.2)
ERYTHROCYTE [DISTWIDTH] IN BLOOD BY AUTOMATED COUNT: 15 % (ref 10–15.5)
GFRAA, 66117: >60
GFRNA, 66118: >60
GLOBULIN,GLOB: 1.8 G/DL (ref 2–4)
GLUCOSE SERPL-MCNC: 163 MG/DL (ref 70–99)
HBA1C MFR BLD HPLC: 7.2 % (ref 4.8–5.6)
HCT VFR BLD AUTO: 44.4 % (ref 35.1–48)
HGB BLD-MCNC: 12.5 G/DL (ref 11.7–16)
MCH RBC QN AUTO: 26 PG (ref 26–34)
MCHC RBC AUTO-ENTMCNC: 28 G/DL (ref 31–36)
MCV RBC AUTO: 92 FL (ref 80–99)
PLATELET # BLD AUTO: 400 K/UL (ref 140–440)
PMV BLD AUTO: 10.7 FL (ref 9–13)
POTASSIUM SERPL-SCNC: 4.7 MMOL/L (ref 3.5–5.5)
PROT SERPL-MCNC: 6.3 G/DL (ref 6.4–8.3)
RBC # BLD AUTO: 4.85 M/UL (ref 3.8–5.2)
SODIUM SERPL-SCNC: 143 MMOL/L (ref 133–145)
T4 FREE SERPL-MCNC: 1 NG/DL (ref 0.9–1.8)
TSH SERPL DL<=0.005 MIU/L-ACNC: 2.6 MCU/ML (ref 0.27–4.2)
WBC # BLD AUTO: 10.7 K/UL (ref 4–11)

## 2022-01-17 LAB — H PYLORI AB, IGG,3341A: <0.89 INDEX

## 2022-01-18 LAB
H PYLORI IGA SER IA-ACNC: <9 UNITS (ref 0–8.9)
H PYLORI IGM SER-ACNC: <9 UNITS (ref 0–8.9)

## 2022-01-25 ENCOUNTER — HOSPITAL ENCOUNTER (OUTPATIENT)
Dept: NUCLEAR MEDICINE | Age: 52
Discharge: HOME | End: 2022-01-25
Attending: SPECIALIST
Payer: COMMERCIAL

## 2022-01-25 DIAGNOSIS — K31.84 GASTROPARESIS: ICD-10-CM

## 2022-01-25 PROCEDURE — 78264 GASTRIC EMPTYING IMG STUDY: CPT

## 2023-12-14 ENCOUNTER — HOSPITAL ENCOUNTER (OUTPATIENT)
Facility: HOSPITAL | Age: 53
Discharge: HOME | End: 2023-12-16
Payer: OTHER GOVERNMENT

## 2023-12-14 ENCOUNTER — TRANSCRIBE ORDERS (OUTPATIENT)
Facility: HOSPITAL | Age: 53
End: 2023-12-14

## 2023-12-14 ENCOUNTER — HOSPITAL ENCOUNTER (OUTPATIENT)
Facility: HOSPITAL | Age: 53
Discharge: HOME | End: 2023-12-14
Payer: OTHER GOVERNMENT

## 2023-12-14 DIAGNOSIS — M79.671 RIGHT FOOT PAIN: ICD-10-CM

## 2023-12-14 DIAGNOSIS — M20.41 HAMMER TOE OF RIGHT FOOT: ICD-10-CM

## 2023-12-14 DIAGNOSIS — M21.6X1 ACQUIRED EXTERNAL ROTATION OF FOOT, RIGHT: Primary | ICD-10-CM

## 2023-12-14 DIAGNOSIS — M21.6X1 ACQUIRED EXTERNAL ROTATION OF FOOT, RIGHT: ICD-10-CM

## 2023-12-14 LAB
ANION GAP SERPL CALC-SCNC: 4 MMOL/L (ref 3–18)
BUN SERPL-MCNC: 14 MG/DL (ref 7–18)
BUN/CREAT SERPL: 26 (ref 12–20)
CALCIUM SERPL-MCNC: 9.6 MG/DL (ref 8.5–10.1)
CHLORIDE SERPL-SCNC: 110 MMOL/L (ref 100–111)
CO2 SERPL-SCNC: 28 MMOL/L (ref 21–32)
CREAT SERPL-MCNC: 0.53 MG/DL (ref 0.6–1.3)
EST. AVERAGE GLUCOSE BLD GHB EST-MCNC: 111 MG/DL
GLUCOSE SERPL-MCNC: 38 MG/DL (ref 74–99)
HBA1C MFR BLD: 5.5 % (ref 4.2–5.6)
HCT VFR BLD AUTO: 39.9 % (ref 35–45)
HGB BLD-MCNC: 12.6 G/DL (ref 12–16)
POTASSIUM SERPL-SCNC: 4.1 MMOL/L (ref 3.5–5.5)
SODIUM SERPL-SCNC: 142 MMOL/L (ref 136–145)

## 2023-12-14 PROCEDURE — 93005 ELECTROCARDIOGRAM TRACING: CPT

## 2023-12-14 PROCEDURE — 36415 COLL VENOUS BLD VENIPUNCTURE: CPT

## 2023-12-14 PROCEDURE — 80048 BASIC METABOLIC PNL TOTAL CA: CPT

## 2023-12-14 PROCEDURE — 85018 HEMOGLOBIN: CPT

## 2023-12-14 PROCEDURE — 83036 HEMOGLOBIN GLYCOSYLATED A1C: CPT

## 2023-12-14 PROCEDURE — 85014 HEMATOCRIT: CPT

## 2023-12-15 LAB
EKG ATRIAL RATE: 75 BPM
EKG DIAGNOSIS: NORMAL
EKG P AXIS: 64 DEGREES
EKG P-R INTERVAL: 114 MS
EKG Q-T INTERVAL: 398 MS
EKG QRS DURATION: 102 MS
EKG QTC CALCULATION (BAZETT): 444 MS
EKG R AXIS: 4 DEGREES
EKG T AXIS: 35 DEGREES
EKG VENTRICULAR RATE: 75 BPM

## 2024-08-31 NOTE — PATIENT INSTRUCTIONS
New patient Instructions      1. Ensure all pre-operative insurances requirements are complete (ie; dietary visits, psychology consults, primary care documentation, etc)    2. Adhere to pre-operative weight loss / weight maintenance plan discussed in the office today. 3. Contact the office with any questions on pre-operative clearance issues (ie; cardiology work-up, pulmonary work-up, upper GI study, etc). 4. If a barium upper GI study has been ordered for your evaluation, make sure you are on liquids only the morning of the procedure. Her/She

## (undated) DEVICE — MAJ-1414 SINGLE USE ADPATER BIOPSY VALV: Brand: SINGLE USE ADAPTOR BIOPSY VALVE

## (undated) DEVICE — MEDI-VAC NON-CONDUCTIVE SUCTION TUBING: Brand: CARDINAL HEALTH

## (undated) DEVICE — TRAP SPEC COLL POLYP POLYSTYR --

## (undated) DEVICE — GARMENT,MEDLINE,DVT,INT,CALF,MED, GEN2: Brand: MEDLINE

## (undated) DEVICE — CATH IV SAFE STR 22GX1IN BLU -- PROTECTIV PLUS

## (undated) DEVICE — SYRINGE 50ML E/T

## (undated) DEVICE — SET ADMIN 16ML TBNG L100IN 2 Y INJ SITE IV PIGGY BK DISP

## (undated) DEVICE — KENDALL RADIOLUCENT FOAM MONITORING ELECTRODE RECTANGULAR SHAPE: Brand: KENDALL

## (undated) DEVICE — SYR 5ML 1/5 GRAD LL NSAF LF --

## (undated) DEVICE — MOUTHPIECE ENDOSCP 20X27MM --

## (undated) DEVICE — CATH SUC CTRL PRT TRIFLO 14FR --

## (undated) DEVICE — TRNQT TEXT 1X18IN BLU LF DISP -- CONVERT TO ITEM 362165

## (undated) DEVICE — CANNULA CUSH AD W/ 14FT TBG

## (undated) DEVICE — REM POLYHESIVE ADULT PATIENT RETURN ELECTRODE: Brand: VALLEYLAB

## (undated) DEVICE — SYR 3ML LL TIP 1/10ML GRAD --

## (undated) DEVICE — NDL PRT INJ NSAF BLNT 18GX1.5 --

## (undated) DEVICE — NDL FLTR TIP 5 MIC 18GX1.5IN --

## (undated) DEVICE — WRISTBAND ID AD W2.5XL9.5CM RED VYN ADH CLSR UNI-PRINT

## (undated) DEVICE — SOLUTION IV 500ML 0.9% SOD CHL FLX CONT

## (undated) DEVICE — SPONGE GZ W4XL4IN RAYON POLY 4 PLY NONWOVEN FASTER WICKING

## (undated) DEVICE — ENDO CARRY-ON PROCEDURE KIT INCLUDES ENZYMATIC SPONGE, GAUZE, BIOHAZARD LABEL, TRAY, LUBRICANT, DIRTY SCOPE LABEL, WATER LABEL, TRAY, DRAWSTRING PAD, AND DEFENDO 4-PIECE KIT.: Brand: ENDO CARRY-ON PROCEDURE KIT

## (undated) DEVICE — SPONGE GZ W4XL4IN COT 12 PLY TYP VII WVN C FLD DSGN

## (undated) DEVICE — SINGLE PORT MANIFOLD: Brand: NEPTUNE 2